# Patient Record
(demographics unavailable — no encounter records)

---

## 2024-12-18 NOTE — HEALTH RISK ASSESSMENT
[Very Good] : ~his/her~  mood as very good [No] : No [No falls in past year] : Patient reported no falls in the past year [1] : 2) Feeling down, depressed, or hopeless for several days (1) [PHQ-2 Negative - No further assessment needed] : PHQ-2 Negative - No further assessment needed [Never] : Never [NO] : No [Patient reported mammogram was normal] : Patient reported mammogram was normal [Patient reported PAP Smear was normal] : Patient reported PAP Smear was normal [With Significant Other] : lives with significant other [With Family] : lives with family [Employed] : employed [Feels Safe at Home] : Feels safe at home [Fully functional (bathing, dressing, toileting, transferring, walking, feeding)] : Fully functional (bathing, dressing, toileting, transferring, walking, feeding) [Fully functional (using the telephone, shopping, preparing meals, housekeeping, doing laundry, using] : Fully functional and needs no help or supervision to perform IADLs (using the telephone, shopping, preparing meals, housekeeping, doing laundry, using transportation, managing medications and managing finances) [Reports normal functional visual acuity (ie: able to read med bottle)] : Reports normal functional visual acuity [Smoke Detector] : smoke detector [Carbon Monoxide Detector] : carbon monoxide detector [Audit-CScore] : 0 [[id number]] : 2 [Reports changes in hearing] : Reports no changes in hearing [Reports changes in vision] : Reports no changes in vision [Reports changes in dental health] : Reports no changes in dental health [MammogramDate] : 07/24 [MammogramComments] : Noted to patient radiologist recommends comparison views [PapSmearDate] : 01/24 [FreeTextEntry2] :

## 2024-12-18 NOTE — HISTORY OF PRESENT ILLNESS
[FreeTextEntry1] : Pt is here for annual PE and to go over chronic medical conditions. Patient is a .   She states she sometimes gets rapid, regular palpitations with shortness of breath when she is figure skating.  She does not feel like it is changed and symptoms do not occur when she is not exerting herself.